# Patient Record
Sex: FEMALE | Race: WHITE | URBAN - METROPOLITAN AREA
[De-identification: names, ages, dates, MRNs, and addresses within clinical notes are randomized per-mention and may not be internally consistent; named-entity substitution may affect disease eponyms.]

---

## 2022-01-04 ENCOUNTER — OFFICE VISIT (OUTPATIENT)
Dept: URBAN - METROPOLITAN AREA CLINIC 37 | Facility: CLINIC | Age: 77
End: 2022-01-04
Payer: MEDICARE

## 2022-01-04 DIAGNOSIS — H52.223 REGULAR ASTIGMATISM, BILATERAL: ICD-10-CM

## 2022-01-04 DIAGNOSIS — Z96.1 PRESENCE OF INTRAOCULAR LENS: Primary | ICD-10-CM

## 2022-01-04 DIAGNOSIS — H43.813 VITREOUS DEGENERATION, BILATERAL: ICD-10-CM

## 2022-01-04 DIAGNOSIS — H35.40 UNSPECIFIED PERIPHERAL RETINAL DEGENERATION: ICD-10-CM

## 2022-01-04 PROCEDURE — 99204 OFFICE O/P NEW MOD 45 MIN: CPT | Performed by: OPTOMETRIST

## 2022-01-04 ASSESSMENT — VISUAL ACUITY
OS: 20/20
OD: 20/20

## 2022-01-04 ASSESSMENT — INTRAOCULAR PRESSURE
OD: 15
OS: 14

## 2022-01-04 NOTE — IMPRESSION/PLAN
Impression: Presence of intraocular lens: Z96.1. Plan: MONITOR REFRACTION and VA.  RTC if any changes